# Patient Record
Sex: FEMALE | Race: WHITE | URBAN - METROPOLITAN AREA
[De-identification: names, ages, dates, MRNs, and addresses within clinical notes are randomized per-mention and may not be internally consistent; named-entity substitution may affect disease eponyms.]

---

## 2017-12-12 ENCOUNTER — HOSPITAL ENCOUNTER (OUTPATIENT)
Dept: LAB | Age: 22
Discharge: HOME | End: 2017-12-12
Payer: COMMERCIAL

## 2017-12-12 ENCOUNTER — OFFICE VISIT (OUTPATIENT)
Dept: FAMILY MEDICINE CLINIC | Age: 22
End: 2017-12-12

## 2017-12-12 VITALS
RESPIRATION RATE: 18 BRPM | HEART RATE: 76 BPM | TEMPERATURE: 95.2 F | SYSTOLIC BLOOD PRESSURE: 117 MMHG | WEIGHT: 136.9 LBS | BODY MASS INDEX: 24.26 KG/M2 | HEIGHT: 63 IN | OXYGEN SATURATION: 98 % | DIASTOLIC BLOOD PRESSURE: 74 MMHG

## 2017-12-12 DIAGNOSIS — Z76.89 ESTABLISHING CARE WITH NEW DOCTOR, ENCOUNTER FOR: Primary | ICD-10-CM

## 2017-12-12 DIAGNOSIS — F32.89 OTHER DEPRESSION: ICD-10-CM

## 2017-12-12 DIAGNOSIS — F84.5 ASPERGER'S DISORDER: ICD-10-CM

## 2017-12-12 DIAGNOSIS — Z76.89 ESTABLISHING CARE WITH NEW DOCTOR, ENCOUNTER FOR: ICD-10-CM

## 2017-12-12 PROBLEM — L30.9 ECZEMA: Status: ACTIVE | Noted: 2017-12-12

## 2017-12-12 PROBLEM — F29 PSYCHOTIC DISORDER (HCC): Status: ACTIVE | Noted: 2017-12-12

## 2017-12-12 PROBLEM — F32.A DEPRESSION: Status: ACTIVE | Noted: 2017-12-12

## 2017-12-12 LAB
ALBUMIN SERPL-MCNC: 3.9 G/DL (ref 3.4–5)
ALBUMIN/GLOB SERPL: 1.2 {RATIO} (ref 0.8–1.7)
ALP SERPL-CCNC: 77 U/L (ref 45–117)
ALT SERPL-CCNC: 16 U/L (ref 13–56)
ANION GAP SERPL CALC-SCNC: 8 MMOL/L (ref 3–18)
AST SERPL-CCNC: 12 U/L (ref 15–37)
BILIRUB SERPL-MCNC: 0.5 MG/DL (ref 0.2–1)
BUN SERPL-MCNC: 11 MG/DL (ref 7–18)
BUN/CREAT SERPL: 17 (ref 12–20)
CALCIUM SERPL-MCNC: 9 MG/DL (ref 8.5–10.1)
CHLORIDE SERPL-SCNC: 106 MMOL/L (ref 100–108)
CO2 SERPL-SCNC: 26 MMOL/L (ref 21–32)
CREAT SERPL-MCNC: 0.63 MG/DL (ref 0.6–1.3)
ERYTHROCYTE [DISTWIDTH] IN BLOOD BY AUTOMATED COUNT: 12.1 % (ref 11.6–14.5)
GLOBULIN SER CALC-MCNC: 3.2 G/DL (ref 2–4)
GLUCOSE SERPL-MCNC: 78 MG/DL (ref 74–99)
HCT VFR BLD AUTO: 39.1 % (ref 35–45)
HGB BLD-MCNC: 12.7 G/DL (ref 12–16)
MCH RBC QN AUTO: 28.2 PG (ref 24–34)
MCHC RBC AUTO-ENTMCNC: 32.5 G/DL (ref 31–37)
MCV RBC AUTO: 86.9 FL (ref 74–97)
PLATELET # BLD AUTO: 312 K/UL (ref 135–420)
PMV BLD AUTO: 11.6 FL (ref 9.2–11.8)
POTASSIUM SERPL-SCNC: 4.1 MMOL/L (ref 3.5–5.5)
PROT SERPL-MCNC: 7.1 G/DL (ref 6.4–8.2)
RBC # BLD AUTO: 4.5 M/UL (ref 4.2–5.3)
SODIUM SERPL-SCNC: 140 MMOL/L (ref 136–145)
T4 FREE SERPL-MCNC: 1.1 NG/DL (ref 0.7–1.5)
TSH SERPL DL<=0.05 MIU/L-ACNC: 0.92 UIU/ML (ref 0.36–3.74)
WBC # BLD AUTO: 5.8 K/UL (ref 4.6–13.2)

## 2017-12-12 PROCEDURE — 84443 ASSAY THYROID STIM HORMONE: CPT | Performed by: NURSE PRACTITIONER

## 2017-12-12 PROCEDURE — 84439 ASSAY OF FREE THYROXINE: CPT | Performed by: NURSE PRACTITIONER

## 2017-12-12 PROCEDURE — 80053 COMPREHEN METABOLIC PANEL: CPT | Performed by: NURSE PRACTITIONER

## 2017-12-12 PROCEDURE — 85027 COMPLETE CBC AUTOMATED: CPT | Performed by: NURSE PRACTITIONER

## 2017-12-12 PROCEDURE — 36415 COLL VENOUS BLD VENIPUNCTURE: CPT | Performed by: NURSE PRACTITIONER

## 2017-12-12 RX ORDER — CETIRIZINE HCL 10 MG
TABLET ORAL
COMMUNITY

## 2017-12-12 NOTE — PROGRESS NOTES
Chief Complaint   Patient presents with    Establish Care    Depression     baseline labs       HPI:  Pt is a 25y.o. year old female who presents for follow up of her chronic medical problems. Chief Complaint   Patient presents with   [address]    Depression     baseline labs     Mrs Mike Blackwell comes in today to establish care with a new PCP. Her previous PCP, Maynor, NP, moved. She has a past medical history most significant for ASD/Aspergers. She recently got a psychological evaluation through [facility]. The full results can be found under media. In summary she was diagnosed with Autism Spectrum Disorder, Level 1, ADHD combined mild, major depressive disorder single episode moderate. She is currently not on any medications for her diagnosis.      PHQ over the last two weeks 12/12/2017 12/12/2017   Little interest or pleasure in doing things More than half the days More than half the days   Feeling down, depressed or hopeless Several days -   Total Score PHQ 2 3 -   Trouble falling or staying asleep, or sleeping too much More than half the days -   Feeling tired or having little energy Nearly every day -   Poor appetite or overeating Several days -   Feeling bad about yourself - or that you are a failure or have let yourself or your family down More than half the days -   Trouble concentrating on things such as school, work, reading or watching TV More than half the days -   Moving or speaking so slowly that other people could have noticed; or the opposite being so fidgety that others notice Nearly every day -   Thoughts of being better off dead, or hurting yourself in some way Not at all -   PHQ 9 Score 16 -   ]    · ROS: Positives are in Red  · General:  chills, fever, weight changes or malaise   · Skin, hair, nails:   rashes, lumps, sores, itching, dryness, color changes, changes in hair or nails,easy bruising or bleeding   · Head:  head trauma, headaches, vertigo or confusion   · Eyes: pain, redness, excessive tearing, diplopia and blurred vision, corrective lenses   · Ears:  pain, hearing changes, infection, discharge   · Nose/throat:  sore throat or tongue, nasal congestion, bleeding gums,  dry mouth, vocal changes, neck lumps, neck pain or stiffness   · Respiratory:  cough, shortness of breath, or wheezing, denies hemoptysis, pleuritic pain, changes in stamina   · Cardiovascular:   chest pain, palpitations, or dyspnea on exertion; no orthopnea or PND, edema   · Gastrointestinal: abdominal pain, N/V, change in bowel habits,  black or bloody stools, denies changes in appetitie, jaundice, heartburn   · Musculoskeletal:  back pain, joint pain, joint stiffness, muscle pain or muscle weakness  · Neurological: numbness, tingling,seizures, tremors, involuntary movements, memory loss   · Psychological: anxiety, depression, sleep disturbances, suicidal or homicidal ideations   · Urinary:  frequency, urgency, dysuria, hematuria, incontinence, nocturia   · Endocrine:  heat or cold intolerance, excessive sweating, polydipsia, polyphagia, polyuria  · Female reproductive: LMP: 11/15, never been pregnanat, never has sexual intercourse, no pain    Patient Active Problem List   Diagnosis Code    Depression F32.9    Psychotic disorder F29    Asperger's disorder F84.5    Eczema L30.9       Past Medical History:   Diagnosis Date    Asperger's disorder     asd    Depression     Eczema     Psychotic disorder           Social History     Social History    Marital status: SINGLE     Spouse name: N/A    Number of children: N/A    Years of education: N/A     Occupational History    Not on file.      Social History Main Topics    Smoking status: Never Smoker    Smokeless tobacco: Never Used    Alcohol use No, family tries to get her to taste alcohol but she does not like any of it    Drug use: No    Sexual activity: No     Other Topics Concern    Not on file     Social History Narrative       Current Outpatient Prescriptions   Medication Sig Dispense Refill    cetirizine (ZYRTEC) 10 mg tablet Take  by mouth. History   Smoking Status    Never Smoker   Smokeless Tobacco    Never Used       Allergies   Allergen Reactions    Other Food Swelling     Eyes got swollen and red after eating smoked salmon    Other Plant, Animal, Environmental Sneezing       Patient Labs were reviewed: yes and n/a         Patient Past Records were reviewed:  yes        Objective:     Vitals:    12/12/17 1016   BP: 117/74   Pulse: 76   Resp: 18   Temp: 95.2 °F (35.1 °C)   TempSrc: Oral   SpO2: 98%   Weight: 136 lb 14.4 oz (62.1 kg)   Height: 5' 3\" (1.6 m)        Body mass index is 24.25 kg/(m^2). Exam:   Appearance: alert, well appearing,  neatly groomed, dressed appropriately for situation and weather, credible source of information, no  used, does not make eye contact, little interaction conversationally   Head: Normocephalic with no masses, protrusions, depressions, facial features symmetric. No edema. No palpable nodes  Neck: Symmetrical accessory muscles, trachea midline, head held erect.  Thyroid without masses, no cervical lymphadenopathy, no JVD,   Eye:  no nystagmus or lid lag, no drainage, no redness, no masses,  Anicteric sclera , no ptosis, pink conjunctiva, PERRLA    Ear:  bilateral auricles equal in size and symmetric, no redness or drainage, Otoscopic examination unremarkable, positive finger rub test bilat  Nose:  Nose midline and proportionate to pt facial features, no drainage, mucosa pink and moist  Mouth: no halitosis, mucous membranes pink and moist, no sores or lesions, uvula midline with symmetric rise   Cardiac:  no cyanosis or venous congestion, no murmurs, rubs or gallops, no peripheral edema, RRR, capillary refill <= 3 seconds, no carotid bruit, pedal pulses palpable  Lungs:  lungs clear bilaterally all lobes, no SOB, chest rise symmetric, good air exchange, no nail bed clubbing, no use of accessory muscles  Abdomen: abd soft,  non-tender upon palpation to all quads, no hernias, no masses or deformities, positive bowel sounds, no hepatosplenomegaly   Skin: dry, warm, intact, no palor, acyanotic, color consistent to ethnic background, no jaundice, lesions, itching, scaling. M/S: uninhibited ROM to all joints, no crepitus, pain, or contractures, negative Romberg (stand with eyes closed), hand  equal bilat  Neuro: no lateralizing signs, CNs II-XII intact,   Psych:  oriented to person, place and time, Flat affect, anxious about getting labs drawn      Assessment/ Plan:   Diagnoses and all orders for this visit:    1. Establishing care with new doctor, encounter for  -     CBC W/O DIFF; Future  -     METABOLIC PANEL, COMPREHENSIVE; Future  -     TSH 3RD GENERATION; Future  -     T4, FREE; Future    2. Other depression  -     CBC W/O DIFF; Future  -     METABOLIC PANEL, COMPREHENSIVE; Future  -     TSH 3RD GENERATION; Future  -     T4, FREE; Future    3. Asperger's disorder    -Check base line labs and thyroid studies to r/o medical condition as source of depression  -encouraged use of my chart for communications, I hope this will be easier for her as opposed to speaking to someone on the phone    Follow-up Disposition:  Return in about 1 year (around 12/12/2018), or if symptoms worsen or fail to improve. I have discussed the diagnosis with the patient and the intended plan as seen in the above orders. The patient has received an After-Visit Summary and questions were answered concerning future plans. Medication Side Effects and Warnings were discussed with patient: yes      Return to clinic if sxs persist, to ER if sxs worsen    Patient verbalized understanding of above instructions. AVS printed and given to pt. Ygnacio Peabody, LALITHA-BC  [facility]   [address] [facility] [address]  [address]

## 2017-12-12 NOTE — PROGRESS NOTES
1. Have you been to the ER, urgent care clinic since your last visit? Hospitalized since your last visit? No    2. Have you seen or consulted any other health care providers outside of the [address] [place] [address] since your last visit? Include any pap smears or colon screening. Yes When: Patient is followed by Araseli Alfaro was seen on 11-2-2017       Patient here today to establish care with new PCP.

## 2017-12-12 NOTE — MR AVS SNAPSHOT
Visit Information Date & Time Provider Department Dept. Phone Encounter #  
 12/12/2017 10:00 AM Inga Velazquez, [facility] [phone] [id number] Follow-up Instructions Return in about 1 year (around 12/12/2018), or if symptoms worsen or fail to improve. Upcoming Health Maintenance Date Due  
 HPV AGE 9Y-34Y (1 of 3 - Female 3 Dose Series) 3/28/2006 DTaP/Tdap/Td series (1 - Tdap) 3/28/2016 PAP AKA CERVICAL CYTOLOGY 3/28/2016 Influenza Age 5 to Adult 8/1/2017 Allergies as of 12/12/2017  Review Complete On: 12/12/2017 By: Inga Velazquez NP No Known Allergies Current Immunizations  Reviewed on 5/18/2016 No immunizations on file. Not reviewed this visit You Were Diagnosed With   
  
 Codes Comments Establishing care with new doctor, encounter for    -  Primary ICD-10-CM: Z76.89 
ICD-9-CM: V65.8 Other depression     ICD-10-CM: F32.89 ICD-9-CM: 951 Vitals BP Pulse Temp Resp Height(growth percentile) Weight(growth percentile) 117/74 76 95.2 °F (35.1 °C) (Oral) 18 5' 3\" (1.6 m) 136 lb 14.4 oz (62.1 kg) LMP SpO2 BMI OB Status Smoking Status 11/15/2017 (Approximate) 98% 24.25 kg/m2 Having regular periods Never Smoker BMI and BSA Data Body Mass Index Body Surface Area  
 24.25 kg/m 2 1.66 m 2 Preferred Pharmacy Pharmacy Name Phone [facility] [address] [phone] Your Updated Medication List  
  
   
This list is accurate as of: 12/12/17 10:54 AM.  Always use your most recent med list.  
  
  
  
  
 ZyrTEC 10 mg tablet Generic drug:  cetirizine Take  by mouth. Follow-up Instructions Return in about 1 year (around 12/12/2018), or if symptoms worsen or fail to improve. To-Do List   
 12/12/2017 Lab:  CBC W/O DIFF   
  
 12/12/2017 Lab:  METABOLIC PANEL, COMPREHENSIVE   
  
 12/12/2017 Lab:  T4, FREE   
  
 12/12/2017 Lab:  TSH 3RD GENERATION Introducing [facility]! Sofía Slater introduces [employer] patient portal. Now you can access parts of your medical record, email your doctor's office, and request medication refills online. 1. In your internet browser, go to [email or web address] 2. Click on the First Time User? Click Here link in the Sign In box. You will see the New Member Sign Up page. 3. Enter your [employer] Access Code exactly as it appears below. You will not need to use this code after youve completed the sign-up process. If you do not sign up before the expiration date, you must request a new code. · [employer] Access Code: [email or web address] Expires: 3/12/2018 10:47 AM 
 
4. Enter the last four digits of your Social Security Number (xxxx) and Date of Birth (mm/dd/yyyy) as indicated and click Submit. You will be taken to the next sign-up page. 5. Create a [employer] ID. This will be your [employer] login ID and cannot be changed, so think of one that is secure and easy to remember. 6. Create a [employer] password. You can change your password at any time. 7. Enter your Password Reset Question and Answer. This can be used at a later time if you forget your password. 8. Enter your e-mail address. You will receive e-mail notification when new information is available in [address] 9. Click Sign Up. You can now view and download portions of your medical record. 10. Click the Download Summary menu link to download a portable copy of your medical information. If you have questions, please visit the Frequently Asked Questions section of the [employer] website. Remember, [employer] is NOT to be used for urgent needs. For medical emergencies, dial 911. Now available from your iPhone and Android! Please provide this summary of care documentation to your next provider. Your primary care clinician is listed as Bruce Pester.  If you have any questions after today's visit, please call [phone].

## 2018-03-26 ENCOUNTER — HOSPITAL ENCOUNTER (OUTPATIENT)
Dept: LAB | Age: 23
Discharge: HOME | End: 2018-03-26
Payer: COMMERCIAL

## 2018-03-26 ENCOUNTER — OFFICE VISIT (OUTPATIENT)
Dept: FAMILY MEDICINE CLINIC | Age: 23
End: 2018-03-26

## 2018-03-26 VITALS
WEIGHT: 135 LBS | HEIGHT: 63 IN | HEART RATE: 89 BPM | TEMPERATURE: 96.5 F | RESPIRATION RATE: 18 BRPM | DIASTOLIC BLOOD PRESSURE: 68 MMHG | BODY MASS INDEX: 23.92 KG/M2 | OXYGEN SATURATION: 96 % | SYSTOLIC BLOOD PRESSURE: 102 MMHG

## 2018-03-26 DIAGNOSIS — Z02.0 SCHOOL HEALTH EXAMINATION: Primary | ICD-10-CM

## 2018-03-26 DIAGNOSIS — Z02.0 SCHOOL HEALTH EXAMINATION: ICD-10-CM

## 2018-03-26 LAB — RUBV IGG SER-IMP: NORMAL

## 2018-03-26 PROCEDURE — 86735 MUMPS ANTIBODY: CPT | Performed by: NURSE PRACTITIONER

## 2018-03-26 PROCEDURE — 86706 HEP B SURFACE ANTIBODY: CPT | Performed by: NURSE PRACTITIONER

## 2018-03-26 PROCEDURE — 80307 DRUG TEST PRSMV CHEM ANLYZR: CPT | Performed by: NURSE PRACTITIONER

## 2018-03-26 PROCEDURE — 86762 RUBELLA ANTIBODY: CPT | Performed by: NURSE PRACTITIONER

## 2018-03-26 PROCEDURE — 86787 VARICELLA-ZOSTER ANTIBODY: CPT | Performed by: NURSE PRACTITIONER

## 2018-03-26 PROCEDURE — 86765 RUBEOLA ANTIBODY: CPT | Performed by: NURSE PRACTITIONER

## 2018-03-26 NOTE — MR AVS SNAPSHOT
[facility] [address] [facility] [place] [facility] 83 [address] 
[phone] Patient: Daymon Rubinstein MRN: [id number] :1995 Visit Information Date & Time Provider Department Dept. Phone Encounter #  
 3/26/2018 11:30 AM Priyanka Boss NP  [address] [facility] [phone] [id number] Follow-up Instructions Return in about 3 days (around 3/29/2018) for PPD reading. Upcoming Health Maintenance Date Due  
 HPV AGE 9Y-34Y (1 of 3 - Female 3 Dose Series) 3/28/2006 DTaP/Tdap/Td series (1 - Tdap) 3/28/2016 PAP AKA CERVICAL CYTOLOGY 3/28/2016 Influenza Age 5 to Adult 2017 Allergies as of 3/26/2018  Review Complete On: 2017 By: Priyanka Boss NP Severity Noted Reaction Type Reactions Other Food High 2017   Not Verified Swelling Eyes got swollen and red after eating smoked salmon Other Plant, Animal, Environmental  2017    Sneezing Current Immunizations  Reviewed on 2016 Name Date  
 TB Skin Test (PPD) Intradermal 3/26/2018 12:15 PM  
 Tdap 3/26/2018 12:16 PM  
  
 Not reviewed this visit You Were Diagnosed With   
  
 Codes Comments School health examination    -  Primary ICD-10-CM: Z02.0 ICD-9-CM: V70.5 Vitals BP Pulse Temp Resp Height(growth percentile) Weight(growth percentile) 102/68 (BP 1 Location: Left arm, BP Patient Position: Sitting) 89 96.5 °F (35.8 °C) (Oral) 18 5' 3\" (1.6 m) 135 lb (61.2 kg) SpO2 BMI OB Status Smoking Status 96% 23.91 kg/m2 Having regular periods Never Smoker BMI and BSA Data Body Mass Index Body Surface Area  
 23.91 kg/m 2 1.65 m 2 Preferred Pharmacy Pharmacy Name Phone [facility], [address] [place] [address] [phone] Your Updated Medication List  
  
   
This list is accurate as of 3/26/18 12:19 PM.  Always use your most recent med list.  
  
  
  
  
 ZyrTEC 10 mg tablet Generic drug:  cetirizine Take  by mouth. We Performed the Following AMB POC TUBERCULOSIS, INTRADERMAL (SKIN TEST) [88854 CPT(R)] TETANUS, DIPHTHERIA TOXOIDS AND ACELLULAR PERTUSSIS VACCINE (TDAP), IN INDIVIDS. >=7, IM [id number] CPT(R)] Follow-up Instructions Return in about 3 days (around 3/29/2018) for PPD reading. To-Do List   
 03/26/2018 Lab:  11-DRUG SCREEN, URINE   
  
 03/26/2018 Lab:  HEP B SURFACE AB   
  
 03/26/2018 Lab:  MEASLES/MUMPS/RUBELLA IMMUNITY   
  
 03/26/2018 Lab:  VZV AB, IGG Introducing [facility]! Dear Regis Navarrete: Thank you for requesting a [employer] account. Our records indicate that you already have an active [employer] account. You can access your account anytime at [email or web address] Did you know that you can access your hospital and ER discharge instructions at any time in [employer]? You can also review all of your test results from your hospital stay or ER visit. Additional Information If you have questions, please visit the Frequently Asked Questions section of the [employer] website at [email or web address] Remember, [employer] is NOT to be used for urgent needs. For medical emergencies, dial 911. Now available from your iPhone and Android! Please provide this summary of care documentation to your next provider. Your primary care clinician is listed as iNcho Clifford. If you have any questions after today's visit, please call [phone].

## 2018-03-26 NOTE — LETTER
To whom it may concern,  
 
 Ms. Chris Black is free of communicable diseases and cleared to participate in the externship. Alyssa Rivera, [facility] [address] [facility] Kevin Cristina [facility]

## 2018-03-26 NOTE — PROGRESS NOTES
1. Have you been to the ER, urgent care clinic since your last visit? Hospitalized since your last visit? No    2. Have you seen or consulted any other health care providers outside of the [address] [place] [address] since your last visit? Include any pap smears or colon screening. No      Patient here today for a Physical exam and to get forms filled out.

## 2018-03-26 NOTE — PROGRESS NOTES
Keenan Box is a 25 y.o. 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 David Epps is here today for a school physical. 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 She has no other problems or complaints needing to be addressed. ROS:  Constitutional: No recent weight change. No weakness/fatigue. No fever or chills   Skin: No rashes, change in nails/hair, itching   HENT: No HA, dizziness. No hearing loss/tinnitus. No nasal congestion/discharge. Eyes: No change in vision, double/blurred vision or eye pain/redness. Cardiovascular: No CP/palpitations. No [place]/orthopnea/PND. Respiratory: No cough/sputum, dyspnea, wheezing. Gastointestinal: No dysphagia, reflux. No n/v. No constipation/diarrhea. No melena/rectal bleeding. Genitourinary: No dysuria, urinary hesitancy, nocturia, hematuria. No incontinence. Musculoskeletal: No joint pain/stiffness. No muscle pain/tenderness. Endo: No heat/cold intolerance, no polyuria/polydypsia. Heme: No h/o anemia. No easy bleeding/bruising. Allergy/Immunology: No seasonal rhinitis. Denies frequent colds, sinus/ear infections. Neurological: No seizures/numbness/weakness. No paresthesias. Psychiatric:  No depression -past medical, anxiety. The problem list was updated as a part of today's visit. Patient Active Problem List   Diagnosis Code    Depression F32.9    Psychotic disorder F29    Asperger's disorder F84.5    Eczema L30.9       The PSH, FH were reviewed. SH:  Social History   Substance Use Topics    Smoking status: Never Smoker    Smokeless tobacco: Never Used    Alcohol use No         Medications/Allergies:  Current Outpatient Prescriptions on File Prior to Visit   Medication Sig Dispense Refill    cetirizine (ZYRTEC) 10 mg tablet Take  by mouth. No current facility-administered medications on file prior to visit.            Allergies   Allergen Reactions    Other Food Swelling     Eyes got swollen and red after eating smoked salmon    Other Plant, Animal, Environmental Sneezing       Objective:  Visit Vitals    /68 (BP 1 Location: Left arm, BP Patient Position: Sitting)    Pulse 89    Temp 96.5 °F (35.8 °C) (Oral)    Resp 18    Ht 5' 3\" (1.6 m)    Wt 135 lb (61.2 kg)    SpO2 96%    BMI 23.91 kg/m2    Body mass index is 23.91 kg/(m^2). Constitutional: Well developed, nourished, no distress, alert, credible source of information, no  used, soft spoken, makes little eye contact   HENT: Exterior ears and tympanic membranes normal bilaterally. Supple neck. No thyromegaly or lymphadenopathy. Oropharynx clear and moist mucous membranes. Eyes: Conjunctiva normal. PERRL. CV: S1, S2.  RRR. No murmurs/rubs. No thrills palpated. No carotid bruits. Intact distal pulses. No edema. Pulm: No abnormalities on inspection. Clear to auscultation bilaterally. No wheezing/rhonchi. Normal effort. GI: Soft, nontender, nondistended. Normal active bowel sounds. No  masses on palpation. MS: Gait normal.  Joints without deformity/tenderness. Strength intact bilateral upper and lower ext. Normal ROM all extremities. Neuro: A/O x 3.  CN 2-12 intact. No focal motor or sensory deficits. Speech normal..   Skin: No lesions/rashes on inspection. Psych: Appropriate judgement and insight. flat affect Short-term memory intact.        Labwork and Ancillary Studies:    CBC w/Diff  Lab Results   Component Value Date/Time    WBC 5.8 12/12/2017 10:56 AM    HGB 12.7 12/12/2017 10:56 AM    PLATELET 883 47/11/3889 10:56 AM         Basic Metabolic Profile  Lab Results   Component Value Date/Time    Sodium 140 12/12/2017 10:56 AM    Potassium 4.1 12/12/2017 10:56 AM    Chloride 106 12/12/2017 10:56 AM    CO2 26 12/12/2017 10:56 AM    Anion gap 8 12/12/2017 10:56 AM    Glucose 78 12/12/2017 10:56 AM    BUN 11 12/12/2017 10:56 AM Creatinine 0.63 12/12/2017 10:56 AM    BUN/Creatinine ratio 17 12/12/2017 10:56 AM    GFR est AA >60 12/12/2017 10:56 AM    GFR est non-AA >60 12/12/2017 10:56 AM    Calcium 9.0 12/12/2017 10:56 AM        Cholesterol  No results found for: CHOL, CHOLX, CHLST, CHOLV, HDL, LDL, LDLC, DLDLP, TGLX, TRIGL, TRIGP, CHHD, CHHDX    Assessment/Plan:    Diagnoses and all orders for this visit:    1. School health examination  -     11-DRUG SCREEN, URINE; Future  -     TETANUS, DIPHTHERIA TOXOIDS AND ACELLULAR PERTUSSIS VACCINE (TDAP), IN INDIVIDS. >=7, IM  -     HEP B SURFACE AB; Future  -     AMB POC TUBERCULOSIS, INTRADERMAL (SKIN TEST)  -     VZV AB, IGG; Future  -     RUBEOLA AB, IGG; Future  -     MUMPS AB, IGG; Future  -     RUBELLA AB, IGG; Future        Health Maintenance:   Health Maintenance   Topic Date Due    HPV AGE 9Y-26Y (1 of 3 - Female 3 Dose Series) 03/28/2006    DTaP/Tdap/Td series (1 - Tdap) 03/28/2016    PAP AKA CERVICAL CYTOLOGY  03/28/2016    Influenza Age 5 to Adult  08/01/2017       Mrs. Kristina Padilla has none of her immunization records. Will draw titers to check for immunity per the requirements stated on her school paperwork. She is to return in 48-72 hours to get her PPD read. An After Visit Summary was printed and given to the patient. All diagnosis have been discussed with the patient and all of the patient's questions have been answered. Follow-up Disposition:  Return in about 3 days (around 3/29/2018) for PPD reading. Charla Love, AGNP-BC  [address] [place] [address]  [address]

## 2018-03-27 LAB
AMPHETAMINE SCREEN, URINE, 701828: NEGATIVE NG/ML
BARBITURATES SCREEN, URINE, 701831: NEGATIVE NG/ML
BENZODIAZEPINES SCREEN, URINE, 701832: NEGATIVE NG/ML
BUPRENORPHINE, URINE: NEGATIVE NG/ML
CANNABINOID SCREEN, URINE, 701833: NEGATIVE NG/ML
COCAINE METAB. SCREEN, URINE, 701834: NEGATIVE NG/ML
CREAT UR-MCNC: 154.7 MG/DL (ref 20–300)
HBV SURFACE AB SER QL IA: NEGATIVE
HBV SURFACE AB SERPL IA-ACNC: <3.1 MIU/ML
HEP BS AB COMMENT,HBSAC: ABNORMAL
METHADONE SCREEN, URINE, 701837: NEGATIVE NG/ML
MEV IGG SER IA-ACNC: >300 AU/ML
MUV IGG SER IA-ACNC: 10.7 AU/ML
OPIATE SCREEN, URINE, 701835: NEGATIVE NG/ML
OXYCODONE/OXYMORPHONE, URINE, 701858: NEGATIVE NG/ML
PH UR: 5.9 [PH] (ref 4.5–8.9)
PHENCYCLIDINE SCREEN, URINE, 701836: NEGATIVE NG/ML
PLEASE NOTE:, 733163: NORMAL
PROPOXYPHENE SCREEN, URINE, 701838: NEGATIVE NG/ML
VZV IGG SER IA-ACNC: 2749 INDEX

## 2018-03-29 ENCOUNTER — CLINICAL SUPPORT (OUTPATIENT)
Dept: FAMILY MEDICINE CLINIC | Age: 23
End: 2018-03-29

## 2018-03-29 DIAGNOSIS — Z11.1 ENCOUNTER FOR PPD SKIN TEST READING: Primary | ICD-10-CM

## 2018-03-29 NOTE — PROGRESS NOTES
PPD Reading Note Weston Gregg came in today for her PPD reading   PPD read and results entered in [employer]. Result: 0 mm induration.   Interpretation: Negative  If test not read within 48-72 hours of initial placement, patient advised to repeat in other arm 1-3 weeks after this test.  Allergic reaction: no

## 2019-03-28 ENCOUNTER — OFFICE VISIT (OUTPATIENT)
Dept: FAMILY MEDICINE CLINIC | Age: 24
End: 2019-03-28

## 2019-03-28 VITALS
TEMPERATURE: 97.1 F | HEIGHT: 63 IN | SYSTOLIC BLOOD PRESSURE: 105 MMHG | BODY MASS INDEX: 24.34 KG/M2 | DIASTOLIC BLOOD PRESSURE: 66 MMHG | WEIGHT: 137.4 LBS | RESPIRATION RATE: 16 BRPM | HEART RATE: 76 BPM

## 2019-03-28 DIAGNOSIS — F33.1 MODERATE EPISODE OF RECURRENT MAJOR DEPRESSIVE DISORDER (HCC): ICD-10-CM

## 2019-03-28 DIAGNOSIS — Z00.00 WELL WOMAN EXAM (NO GYNECOLOGICAL EXAM): Primary | ICD-10-CM

## 2019-03-28 NOTE — PATIENT INSTRUCTIONS

## 2019-03-28 NOTE — PROGRESS NOTES
Subjective:   25 y.o. female for Well Woman Check. Her gyne and breast care is done elsewhere by her Ob-Gyne physician. Patient Active Problem List   Diagnosis Code    Depression F32.9    Psychotic disorder ([facility] [place] [address] F29    Asperger's disorder F84.5    Eczema L30.9     Patient Active Problem List    Diagnosis Date Noted    Depression 12/12/2017    Psychotic disorder ([facility] [place] [address] 12/12/2017    Asperger's disorder 12/12/2017    Eczema 12/12/2017     Current Outpatient Medications   Medication Sig Dispense Refill    cetirizine (ZYRTEC) 10 mg tablet Take  by mouth. Allergies   Allergen Reactions    Other Food Swelling     Eyes got swollen and red after eating smoked salmon    Other Plant, Animal, Environmental Sneezing     Past Medical History:   Diagnosis Date    Asperger's disorder     asd    Depression     Eczema     Psychotic disorder ([facility] [place] [address]      No past surgical history on file.   Family History   Problem Relation Age of Onset    Hypertension Mother     Hypertension Father     Diabetes Maternal Grandmother      Social History     Tobacco Use    Smoking status: Never Smoker    Smokeless tobacco: Never Used   Substance Use Topics    Alcohol use: No        Lab Results   Component Value Date/Time    WBC 5.8 12/12/2017 10:56 AM    HGB 12.7 12/12/2017 10:56 AM    HCT 39.1 12/12/2017 10:56 AM    PLATELET 851 97/99/4702 10:56 AM    MCV 86.9 12/12/2017 10:56 AM     Lab Results   Component Value Date/Time    Sodium 140 12/12/2017 10:56 AM    Potassium 4.1 12/12/2017 10:56 AM    Chloride 106 12/12/2017 10:56 AM    CO2 26 12/12/2017 10:56 AM    Anion gap 8 12/12/2017 10:56 AM    Glucose 78 12/12/2017 10:56 AM    BUN 11 12/12/2017 10:56 AM    Creatinine 0.63 12/12/2017 10:56 AM    BUN/Creatinine ratio 17 12/12/2017 10:56 AM    GFR est AA >60 12/12/2017 10:56 AM    GFR est non-AA >60 12/12/2017 10:56 AM    Calcium 9.0 12/12/2017 10:56 AM    Bilirubin, total 0.5 12/12/2017 10:56 AM    ALT (SGPT) 16 12/12/2017 10:56 AM    AST (SGOT) 12 (L) 12/12/2017 10:56 AM    Alk. phosphatase 77 12/12/2017 10:56 AM    Protein, total 7.1 12/12/2017 10:56 AM    Albumin 3.9 12/12/2017 10:56 AM    Globulin 3.2 12/12/2017 10:56 AM    A-G Ratio 1.2 12/12/2017 10:56 AM         ROS:   Review of Systems   Constitutional: Negative. HENT: Negative. Last Dental exam: 2018   Eyes: Negative. Last eye exam: 2017   Respiratory: Negative. Cardiovascular: Negative. Gastrointestinal: Negative. Genitourinary: Negative. No problems with genitalia   Musculoskeletal: Negative. Skin: Negative. Neurological: Negative. Endo/Heme/Allergies: Negative for polydipsia. Psychiatric/Behavioral: The patient is nervous/anxious and has insomnia. Insomnia: every now and then       Objective:     Vitals:    03/28/19 1336   BP: 105/66   Pulse: 76   Resp: 16   Temp: 97.1 °F (36.2 °C)   Weight: 137 lb 6.4 oz (62.3 kg)   Height: 5' 3\" (1.6 m)        Body mass index is 24.34 kg/m². Physical Exam   Constitutional: She is oriented to person, place, and time and well-developed, well-nourished, and in no distress. Vital signs are normal.   HENT:   Head: Normocephalic and atraumatic. Right Ear: Hearing, tympanic membrane, external ear and ear canal normal.   Left Ear: Hearing, tympanic membrane, external ear and ear canal normal.   Nose: Nose normal.   Mouth/Throat: Uvula is midline, oropharynx is clear and moist and mucous membranes are normal.   Eyes: Pupils are equal, round, and reactive to light. Conjunctivae, EOM and lids are normal.   Neck: Trachea normal and normal range of motion. Neck supple. No JVD present. Carotid bruit is not present. No tracheal deviation present. No thyroid mass and no thyromegaly present. Cardiovascular: Normal rate, regular rhythm, S1 normal, S2 normal, normal heart sounds and intact distal pulses. Pulmonary/Chest: Effort normal and breath sounds normal.   Abdominal: Soft.  Normal appearance and bowel sounds are normal. There is no tenderness. There is no CVA tenderness. Musculoskeletal: Normal range of motion. Lymphadenopathy:        Head (right side): No submental, no submandibular, no tonsillar, no preauricular, no posterior auricular and no occipital adenopathy present. Head (left side): No submental, no submandibular, no tonsillar, no preauricular, no posterior auricular and no occipital adenopathy present. She has no cervical adenopathy. Neurological: She is alert and oriented to person, place, and time. She has normal motor skills. Gait normal.   Skin: Skin is warm and dry. Psychiatric: Mood, memory, affect and judgment normal.   Nursing note and vitals reviewed. Health Maintenance:   Health Maintenance   Topic Date Due    HPV Age 9Y-34Y (3 - Female 3-dose series) 03/28/2010    Influenza Age 5 to Adult  09/28/2019 (Originally 8/1/2018)    PAP AKA CERVICAL CYTOLOGY  03/28/2020 (Originally 3/28/2016)    DTaP/Tdap/Td series (2 - Td) 03/26/2028    Pneumococcal 0-64 years  Aged Out       Specific concerns today: referral to psychiatrist.      Assessment/Plan:   Well Woman  increase physical activity, limit alcohol consumption, follow low fat diet, follow low salt diet, continue present plan, call if any problems    ICD-10-CM ICD-9-CM    1. Well woman exam (no gynecological exam) Z00.00 V70.0     [V70.0]   2. Moderate episode of recurrent major depressive disorder (HCC) F33.1 296.32 REFERRAL TO PSYCHIATRY     Comes in today for annual physical accompanied by her mother. She is requesting an evaluation for a psychiatrist to help manage her Asperger disorder. 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 She is now working in [employer]. She does not wish to have labs drawn today. Given she is 24 and her last labs were normal will not order any today.    No PAP is needed as she is still a virgin. An After Visit Summary was printed and given to the patient. All diagnosis have been discussed with the patient and all of the patient's questions have been answered. Follow-up and Dispositions    · Return in about 1 year (around 3/28/2020) for Annual physical, 30 minutes. Ella Aguilera, [facility]-[address]   Kevin Jasso

## 2021-04-12 ENCOUNTER — DOCUMENTATION ONLY (OUTPATIENT)
Dept: FAMILY MEDICINE CLINIC | Age: 26
End: 2021-04-12

## 2021-04-12 NOTE — PROGRESS NOTES
Patient [place] out of [place], advised patient to complete a Permission to release so records can be forwarded

## 2021-07-02 ENCOUNTER — DOCUMENTATION ONLY (OUTPATIENT)
Dept: FAMILY MEDICINE CLINIC | Age: 26
End: 2021-07-02